# Patient Record
Sex: MALE | Race: WHITE | Employment: FULL TIME | URBAN - METROPOLITAN AREA
[De-identification: names, ages, dates, MRNs, and addresses within clinical notes are randomized per-mention and may not be internally consistent; named-entity substitution may affect disease eponyms.]

---

## 2017-01-13 PROBLEM — C61 PROSTATE CANCER (HCC): Status: ACTIVE | Noted: 2017-01-13

## 2017-01-26 PROBLEM — C61 CANCER OF PROSTATE WITH INTERMEDIATE RECURRENCE RISK (STAGE T2B-C OR GLEASON 7 OR PSA 10-20) (HCC): Status: ACTIVE | Noted: 2017-01-26

## 2017-05-27 PROCEDURE — 87086 URINE CULTURE/COLONY COUNT: CPT | Performed by: PHYSICIAN ASSISTANT

## 2017-06-07 PROBLEM — R39.12 WEAK URINARY STREAM: Status: ACTIVE | Noted: 2017-06-07

## 2017-06-07 PROBLEM — R31.0 GROSS HEMATURIA: Status: ACTIVE | Noted: 2017-06-07

## 2017-06-07 PROBLEM — Z92.3: Status: ACTIVE | Noted: 2017-06-07

## 2017-11-19 PROBLEM — R31.0 GROSS HEMATURIA: Status: RESOLVED | Noted: 2017-06-07 | Resolved: 2017-11-19

## 2017-11-19 PROBLEM — C61 PROSTATE CANCER (HCC): Status: RESOLVED | Noted: 2017-01-13 | Resolved: 2017-11-19

## 2017-11-29 PROBLEM — R39.15 URGENCY OF MICTURITION: Status: ACTIVE | Noted: 2017-11-29

## 2017-11-29 PROBLEM — Z85.46 HISTORY OF PROSTATE CANCER: Status: ACTIVE | Noted: 2017-11-29

## 2017-11-29 PROBLEM — R35.0 URINARY FREQUENCY: Status: ACTIVE | Noted: 2017-11-29

## 2017-11-29 PROBLEM — Z92.3 HISTORY OF RADIATION THERAPY: Status: ACTIVE | Noted: 2017-06-07

## 2018-01-23 PROBLEM — N39.41 URGENCY INCONTINENCE: Status: ACTIVE | Noted: 2018-01-23

## 2018-01-23 PROBLEM — R33.9 INCOMPLETE BLADDER EMPTYING: Status: ACTIVE | Noted: 2018-01-23

## 2018-05-21 PROBLEM — N40.1 BENIGN LOCALIZED PROSTATIC HYPERPLASIA WITH LOWER URINARY TRACT SYMPTOMS (LUTS): Status: ACTIVE | Noted: 2018-05-21

## 2018-05-21 PROBLEM — E66.9 OBESITY, UNSPECIFIED CLASSIFICATION, UNSPECIFIED OBESITY TYPE, UNSPECIFIED WHETHER SERIOUS COMORBIDITY PRESENT: Status: ACTIVE | Noted: 2018-05-21

## 2018-05-21 PROBLEM — I25.10 CAD IN NATIVE ARTERY: Status: ACTIVE | Noted: 2018-05-21

## 2018-05-21 PROBLEM — Z85.46 HISTORY OF PROSTATE CANCER: Status: RESOLVED | Noted: 2017-11-29 | Resolved: 2018-05-21

## 2018-05-23 PROBLEM — Z80.42 FAMILY HISTORY OF PROSTATE CANCER: Status: ACTIVE | Noted: 2018-05-23

## 2018-05-23 PROBLEM — N32.81 OAB (OVERACTIVE BLADDER): Status: ACTIVE | Noted: 2018-05-23

## 2018-07-11 PROBLEM — M54.50 ACUTE RIGHT-SIDED LOW BACK PAIN WITHOUT SCIATICA: Status: ACTIVE | Noted: 2018-07-11

## 2018-08-22 PROBLEM — D35.01 ADRENAL ADENOMA, RIGHT: Status: ACTIVE | Noted: 2018-08-22

## 2018-08-22 PROBLEM — M47.816 ARTHRITIS OF LUMBAR SPINE: Status: ACTIVE | Noted: 2018-08-22

## 2018-08-22 PROBLEM — M43.10 ANTEROLISTHESIS: Status: ACTIVE | Noted: 2018-08-22

## 2018-08-28 PROCEDURE — 87340 HEPATITIS B SURFACE AG IA: CPT | Performed by: INTERNAL MEDICINE

## 2018-08-28 PROCEDURE — 86704 HEP B CORE ANTIBODY TOTAL: CPT | Performed by: INTERNAL MEDICINE

## 2018-08-28 PROCEDURE — 86706 HEP B SURFACE ANTIBODY: CPT | Performed by: INTERNAL MEDICINE

## 2019-01-22 PROBLEM — M17.11 ARTHRITIS OF RIGHT KNEE: Status: ACTIVE | Noted: 2019-01-22

## 2019-06-08 PROCEDURE — 81003 URINALYSIS AUTO W/O SCOPE: CPT | Performed by: UROLOGY

## 2019-12-04 PROBLEM — R35.1 NOCTURIA: Status: ACTIVE | Noted: 2019-12-04

## 2021-01-26 PROBLEM — Z00.00 ROUTINE GENERAL MEDICAL EXAMINATION AT A HEALTH CARE FACILITY: Status: ACTIVE | Noted: 2021-01-26

## 2022-04-03 PROBLEM — N39.41 URGENCY INCONTINENCE: Status: RESOLVED | Noted: 2018-01-23 | Resolved: 2022-04-03

## 2022-04-03 PROBLEM — R33.9 INCOMPLETE BLADDER EMPTYING: Status: RESOLVED | Noted: 2018-01-23 | Resolved: 2022-04-03

## 2022-04-03 PROBLEM — M54.50 ACUTE RIGHT-SIDED LOW BACK PAIN WITHOUT SCIATICA: Status: RESOLVED | Noted: 2018-07-11 | Resolved: 2022-04-03

## 2022-04-03 PROBLEM — Z80.42 FAMILY HISTORY OF PROSTATE CANCER: Status: RESOLVED | Noted: 2018-05-23 | Resolved: 2022-04-03

## 2022-04-03 PROBLEM — N40.1 BENIGN LOCALIZED PROSTATIC HYPERPLASIA WITH LOWER URINARY TRACT SYMPTOMS (LUTS): Status: RESOLVED | Noted: 2018-05-21 | Resolved: 2022-04-03

## 2022-04-03 PROBLEM — E66.9 OBESITY, UNSPECIFIED CLASSIFICATION, UNSPECIFIED OBESITY TYPE, UNSPECIFIED WHETHER SERIOUS COMORBIDITY PRESENT: Status: RESOLVED | Noted: 2018-05-21 | Resolved: 2022-04-03

## 2022-04-03 PROBLEM — R73.02 IGT (IMPAIRED GLUCOSE TOLERANCE): Status: ACTIVE | Noted: 2022-04-03

## 2022-04-03 PROBLEM — N32.81 OAB (OVERACTIVE BLADDER): Status: RESOLVED | Noted: 2018-05-23 | Resolved: 2022-04-03

## 2022-04-03 PROBLEM — Z00.00 ROUTINE GENERAL MEDICAL EXAMINATION AT A HEALTH CARE FACILITY: Status: RESOLVED | Noted: 2021-01-26 | Resolved: 2022-04-03

## 2022-04-03 PROBLEM — R35.0 URINARY FREQUENCY: Status: RESOLVED | Noted: 2017-11-29 | Resolved: 2022-04-03

## 2025-01-24 ENCOUNTER — TELEPHONE (OUTPATIENT)
Dept: ORTHOPEDICS CLINIC | Facility: CLINIC | Age: 75
End: 2025-01-24

## 2025-01-24 NOTE — TELEPHONE ENCOUNTER
Patient is scheduled for spinal stenosis. No xrays or MRI in [facility]. Patient has an MRI disk from April 2023 only. Let him know new imaging would be ordered for his appointment.  Future Appointments   Date Time Provider Department Center   1/27/2025 10:30 AM Chase Dia PA [facility] ORTH [facility] 10 [facility]

## 2025-01-27 ENCOUNTER — HOSPITAL ENCOUNTER (OUTPATIENT)
Dept: GENERAL RADIOLOGY | Facility: HOSPITAL | Age: 75
Discharge: HOME | End: 2025-01-27
Attending: PHYSICIAN ASSISTANT
Payer: MEDICARE

## 2025-01-27 ENCOUNTER — OFFICE VISIT (OUTPATIENT)
Age: 75
End: 2025-01-27
Payer: MEDICARE

## 2025-01-27 DIAGNOSIS — M54.50 LOW BACK PAIN, UNSPECIFIED BACK PAIN LATERALITY, UNSPECIFIED CHRONICITY, UNSPECIFIED WHETHER SCIATICA PRESENT: Primary | ICD-10-CM

## 2025-01-27 DIAGNOSIS — S22.080A COMPRESSION FRACTURE OF T11 VERTEBRA, INITIAL ENCOUNTER (HCC): ICD-10-CM

## 2025-01-27 DIAGNOSIS — M54.50 LOW BACK PAIN, UNSPECIFIED BACK PAIN LATERALITY, UNSPECIFIED CHRONICITY, UNSPECIFIED WHETHER SCIATICA PRESENT: ICD-10-CM

## 2025-01-27 DIAGNOSIS — M48.062 SPINAL STENOSIS OF LUMBAR REGION WITH NEUROGENIC CLAUDICATION: ICD-10-CM

## 2025-01-27 DIAGNOSIS — M43.16 SPONDYLOLISTHESIS, LUMBAR REGION: ICD-10-CM

## 2025-01-27 PROCEDURE — 72100 X-RAY EXAM L-S SPINE 2/3 VWS: CPT | Performed by: PHYSICIAN ASSISTANT

## 2025-01-27 PROCEDURE — 99204 OFFICE O/P NEW MOD 45 MIN: CPT | Performed by: PHYSICIAN ASSISTANT

## 2025-01-27 NOTE — PROGRESS NOTES
Patient: Bandar Medrano  Medical Record Number: [id number]  Site: [facility]  Referring Physician:  No ref. provider found  PCP: None Pcp        HISTORY OF CHIEF COMPLAINT:    Bandar Medrano is a 74 year old male, who complains of a long h/o left side radiating LBP.  Denies saddle anesthesia or incontinence.  Patient has a long history of low back pain that shoots from his left buttock down the side of his leg to his ankle.  He has had a long history of numbness in bilateral feet.  He feels weak with his left leg when walking.  He does use a walker because his left leg feels like it gave out on him.  He has had a history of multiple falls.  He has failed to get relief with physical therapy and injections.  He was seeing Dr. Rosario for injections.  He was told by pain management to see a surgeon.  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  Unfortunately his wife passed away last year.    VAS Pain Score: 5 /10          Past Medical History:    Acute right-sided low back pain without sciatica    Adrenal adenoma, right    Ct  7/18    Anterolisthesis    L4-5 arthritis ct      Arthritis of lumbar spine    Arthritis of right knee    Benign localized prostatic hyperplasia with lower urinary tract symptoms (LUTS)    CAD in native artery    Detected by CTA Negative stress test RCA occluded ?Lad disease?    Cancer of prostate with intermediate recurrence risk (stage T2b-c or Selin 7 or PSA 10-20) (HCC)    Carpal tunnel syndrome, unspecified laterality    Chronic fatigue    Elevated blood pressure reading without diagnosis of hypertension    Insomnia, unspecified type    LFT elevation    Male hypogonadism    Obesity, unspecified classification, unspecified obesity type, unspecified whether serious comorbidity present    VLAD on CPAP    AHI 96 Sao2 Nate 73% CPAP 14cwp    Pernicious anemia    Prostate cancer (HCC)    seeds         RT and then seeds   2017    Sleep apnea    Status  post total left knee replacement    Tinea manuum      Past Surgical History:   Procedure Laterality Date    Biopsy of prostate,needle/punch  01/29/2017    4+3=7, 3+4=7    Colonoscopy  2005    diverticulosis    Colonoscopy  8/12/15    diverticulosis;ASC    Colonoscopy,diagnostic  8/11/2010    Performed by KHAI ROCHA at [facility]    Colonoscopy,diagnostic N/A 8/12/2015    Procedure: COLONOSCOPY, POSSIBLE BIOPSY, POSSIBLE POLYPECTOMY 48313;  Surgeon: Khai Rocha MD;  Location: [facility]    Other surgical history      rt shoulder  sx  1997 and 2000    Other surgical history  10/12/10    Right rotator cuff repair at [facility]; Dr. Duran Oliver    Other surgical history  5/5/17    Seeds    Other surgical history  01/23/2018    Cysto - Dr Elizabeth    Other surgical history      L knee TKR    [place] 2016    Prostate brachy w iodine see  05/05/2017    Iodine, 55 seeds    Special service or report  10/12/10    torn right rotator cuff    Tonsillectomy      had sx when pt was 7 years old    Total knee replacement  7/27/16    Left TKR      Family History   Problem Relation Age of Onset    Cancer Father         lung    Cancer Mother         ovarian    Cancer Brother         colon and skin       Social History     Socioeconomic History    Marital status:      Spouse name: Lima    Number of children: 4    Years of education: 13   Occupational History    Occupation:      Comment: [employer]   Tobacco Use    Smoking status: Never    Smokeless tobacco: Never   Vaping Use    Vaping status: Never Used   Substance and Sexual Activity    Alcohol use: No    Drug use: No    Sexual activity: Never      Current Medications:  Current Outpatient Medications   Medication Sig Dispense Refill    HYDROcodone-acetaminophen (NORCO)  MG Oral Tab One pill tid as needed 100 tablet 0    ezetimibe 10 MG Oral Tab Take 1 tablet (10 mg total) by mouth daily. 90 tablet 3     metroNIDAZOLE 1 % External Gel Apply 1 Application topically 2 (two) times daily. 30 g 5    aspirin 81 MG Oral Tab Take 1 tablet (81 mg total) by mouth daily.          Work History:  The patient currently is retired.    REVIEW OF SYSTEMS:   GENERAL HEALTH: No fevers, chills, recent weight loss or unremitting night pain,  has difficulty sleeping, fatigue  SKIN: No history of skin rashes,  easy bruising,  swollen ankles  EYES: No blurred vision, double vision or changes in vision.  HEENT: No swallowing or hearing difficulties.  RESPIRATORY:  shortness of breath.  CARDIOVASCULAR: No chest pain,  abnormal heart beat or palpitations.   GI: Denies nausea, vomiting, constipation, diarrhea; no rectal bleeding;  heartburn, no melana.  : No dysuria, urgency or frequency; no hematuria.  increase in night urination,  in-ablitity to completely empty bladder  NEURO: has balance problems, no seizure problems, has increased clumsiness, dropping things, no tremor,   headaches/ migraines  PSYCHE:  depression,  anxiety.  HEMATOLOGY: denies hx anemia; denies bruising or excessive bleeding.  ENDOCRINE: denies excessive thirst or urination; denies unexpected wt gain or wt loss.  MUSCULOSKELETAL: DENIES:, Joint pain, Swelling of joints, History of arthritis, Fibromyalgia, Osteoporosis, Hardware, Deformity, Limited Range of motion, Crepitation, Gout, Heel spurs  ALLERGY/IMM.: denies food or seasonal allergies.  No history of cancer, infectious disease exposure.    IMAGING STUDIES:  X-rays were reviewed with the patient today  X-rays  Images were reviewed and discussed with the patient.  They voiced understanding of what the images showed.  EXAM: X-RAY OF LUMBAR SPINE     CLINICAL INFORMATION: Back pain     FINDINGS:     AP, Lateral with flexion/extension views of lumbar spine completed.   5 lumbar vertebral bodies seen.   Spondylosis is seen with disc space loss and sclerotic endplate changes and facet joint hypertrophy noted throughout  the spine     No spondylolysis   L4-5 spondylolisthesis.  Possible T11 compression fracture  No destructive lesions seen.  Severe OA of the left hip joint        IMPRESSION:    Lumbar spondylosis as detailed above      Old MRI reviewed    PHYSICAL EXAMIMATION   PHYSICAL EXAMINATION: Bandar Medrano is a 74 year old   male who is observed sitting comfortably in the exam room alert and oriented times three. RESPIRATORY RATE: 18 He looks consistent his stated age.    There were no vitals taken for this visit.  The patient is well developed, well nourished      Inspection: No acute distress.   Patient displays non-antalgic gait, and is able to normal heel walk, normal toe walk.         ROM:  Forward Flexion  Pain free    Extension  Pain     Palpation:  See chart below:  Palpation of Lumbar Area Right   (POS or NEG) Left   (POS or NEG)   Lumbar paraspinals Neg Neg   SIJ Neg Neg   PSIS Neg Neg   Trochanteric Bursa Neg Neg     Strength:      DTR's:     Left Right    Left Right    EHL 5/5 5/5  Patella  2+/4 2+/4    DF 5/5 5/5  Achilles  2+/4 2+/4    PF 5/5 5/5    Quad 4/5 5/5    IP 4+/5 5/5    Sensation:  Sensory deficits noted on bilateral lower extremities to light touch: none    Tests:  Test Right   (POS or NEG) Left   (POS or NEG)   SLR Neg Neg   Clonus Neg Neg      Calves supple, NT   MUSCULOSKELETAL: Fluid, pain-free ROM to bilateral: ankles, knees  & mild pain with his left hip IR.                                                                                     MEDICAL DECISION MAKING:   Impression: Lumbar Spine:  Lumbar Spondylosis 721.3   Lumbar Spinal Stenosis 724.02   Spondylolisthesis Degenerative 738.4     Plan: We discussed the diagnosis and treatment options today, including rationale for surgical vs non-surgical options.  The patient has failed to get relief with both physical therapy and injections.  He describes neurogenic claudication.  He has a history of falls.  He does have weakness with his  left quad.  He has an L4-5 spondylolisthesis.  He has multiple levels of stenosis on his old lumbar spine MRI.  We reviewed both his MRI as well as his x-rays.  I am ordering a new MRI of his lumbar spine so that he can see Dr. Marin for surgical discussion.  I am ordering an MRI of his thoracic spine because it does appear that he may have a possible T11 compression fracture.  This could be due from one of his falls.  Patient has severe osteoarthritis of his left hip joint.  He will see Dr. Ellis who treats him for his right knee pain.  His concerns were addressed.  Restrictions and limitations were reviewed with the patient.  Concerns were addressed.  Questions were encouraged and answered.  Patient voiced understanding.  Images were reviewed and discussed with the patient.  They voiced understanding of what the images showed.        The patient indicates understanding of these issues and agrees to the plan.    Thank you very much.     Respectfully yours,    Chase Dia PA-C    This note was dictated using Dragon software. While it was briefly proofread prior to completion, some grammatical, spelling, and word choice errors due to dictation may still occur.

## 2025-02-19 ENCOUNTER — PATIENT MESSAGE (OUTPATIENT)
Age: 75
End: 2025-02-19

## 2025-02-19 NOTE — TELEPHONE ENCOUNTER
Spoke w/ patient.  Patient states he has pain that prevents him from completing MRI. He is unable to lay on his back.  Patient would like to know what his options are to assist w/ pain in order to complete MRI (medication, sedation, anesthesia).   Discussed w/ SONJA who recommends patient follow up with Dr. Marin prior to MRI.   Scheduled patient for telehealth appointment with Dr. Marin. Appt date/time provided to patient.   Answered patients questions.   Patient verbalized understanding and agreement.

## 2025-02-20 ENCOUNTER — PATIENT MESSAGE (OUTPATIENT)
Dept: SURGERY | Facility: CLINIC | Age: 75
End: 2025-02-20

## 2025-02-20 ENCOUNTER — VIRTUAL PHONE E/M (OUTPATIENT)
Dept: SURGERY | Facility: CLINIC | Age: 75
End: 2025-02-20
Payer: MEDICARE

## 2025-02-20 ENCOUNTER — TELEPHONE (OUTPATIENT)
Dept: SURGERY | Facility: CLINIC | Age: 75
End: 2025-02-20

## 2025-02-20 DIAGNOSIS — M54.16 LUMBAR RADICULOPATHY: Primary | ICD-10-CM

## 2025-02-20 DIAGNOSIS — M48.062 SPINAL STENOSIS OF LUMBAR REGION WITH NEUROGENIC CLAUDICATION: ICD-10-CM

## 2025-02-20 DIAGNOSIS — M43.17 SPONDYLOLISTHESIS OF LUMBOSACRAL REGION: ICD-10-CM

## 2025-02-20 DIAGNOSIS — M54.16 LUMBAR RADICULOPATHY: ICD-10-CM

## 2025-02-20 DIAGNOSIS — M43.8X9 SAGITTAL PLANE IMBALANCE: Primary | ICD-10-CM

## 2025-02-20 NOTE — PROGRESS NOTES
[facility] - SPINE SURGERY  [phone]     SPINE PROGRESS NOTE         The following individual(s) verbally consented to be recorded using ambient AI listening technology and understand that they can each withdraw their consent to this listening technology at any point by asking the clinician to turn off or pause the recording:    Patient name: Bandar Medrano  Additional names:          Name: Badnar Medrano   MRN: [id number]  Date: 2/20/2025     CC: No chief complaint on file.       HPI:   History of Present Illness  Bandar Medrano is a 74 year old male with severe spinal stenosis who presents with worsening back pain and mobility issues. He was referred by Dr. Carmona for evaluation of severe spinal stenosis.    He experiences excruciating back pain, particularly when attempting to lie down for an MRI, which he was unable to complete due to the severity of the pain. He requires the use of a walker for ambulation due to significant pain.    He has a history of severe spinal stenosis, previously identified in an MRI from April 2023, showing critical stenosis at L4-L5 with spondylolisthesis. He has experienced multiple falls, including one incident where he fell out of bed due to pain-induced restlessness at night.    He experiences pain primarily in his legs, which affects his balance and mobility, stating that without the walker, he has no balance. He also describes difficulty lifting his legs, particularly when climbing stairs, likening them to 'lead'.    He has been taking Norco and Tylenol Arthritis Strength for pain management, but these medications have not provided sufficient relief. Previous attempts at pain management included injections at various spinal levels, which provided limited relief.    He has a history of hip arthritis, particularly on the left side, which he attributes to a family history of arthritis, noting that his father also suffered from severe arthritis. He  experiences intermittent groin pain on the left side, which he associates with his hip condition. No current groin pain is reported, but there is significant weakness in his legs, contributing to his falls and difficulty with mobility.      PE: Patient reports that he is actively tripping falling and is very weak in bilateral lower extremities.  Cannot heel or toe walk          Radiographic Examination/Diagnostics:  I personally viewed, independently interpreted and radiology report was reviewed.  Results  RADIOLOGY  Lumbar spine MRI: Critical stenosis at L4-L5 with spondylolisthesis (04/13/2023)  Lumbar spine x-ray: Grade 2 spondylolisthesis at L4-L5 (01/27/2025)  Hip x-ray: Severe osteoarthritis, bone on bone on the left side        IMPRESSION: Bandar Medrano is a 74 year old male   Assessment & Plan  Lumbar Spinal Stenosis with Spondylolisthesis  Chronic lumbar spinal stenosis with grade 2 spondylolisthesis at L4-L5 causes severe back pain, weakness, and falls. An MRI from April 2023 showed critical stenosis. Pain has increased, making MRI completion difficult. Surgical intervention is likely necessary. Discussed posterior lumbar fusion surgery, which involves two small incisions, nerve decompression, and insertion of hardware and a cage device. Risks include bleeding, infection, and nerve injuries (1-2% risk), with paralysis being extremely unlikely. Surgery lasts about 1.5 hours with an overnight hospital stay. Expected outcomes are pain relief and improved mobility, though not perfect. Order MRI of the lumbar spine with IV sedation. Discuss posterior lumbar fusion surgery and refer to [email or web address] for detailed information. Schedule a follow-up visit after MRI results.    Hip Osteoarthritis  Severe hip osteoarthritis, especially on the left side, causes bone-on-bone contact, contributing to pain and mobility issues. Monitor the condition and consider future orthopedic consultation for hip  management.    General Health Maintenance  Health has been neglected due to caregiving responsibilities. Now addressing multiple health issues. Encourage regular follow-up appointments and provide support and resources for managing chronic conditions.    Follow-up  Schedule a follow-up visit after MRI results. Coordinate with Chase for more accessible follow-up if needed.        ICD-10-CM    1. Lumbar radiculopathy  M54.16 MRI THORACIC+LUMBAR SPINE  (CPT=72146/84687)      2. Spinal stenosis of lumbar region with neurogenic claudication  M48.062 MRI THORACIC+LUMBAR SPINE  (CPT=72146/97268)      3. Spondylolisthesis of lumbosacral region  M43.17 MRI THORACIC+LUMBAR SPINE  (CPT=72146/24232)           PLAN: Bandar and MICAELA went over imaging, prior treatments and arranged for a plan that incorporated personal goals, social circumstances and any current medical challenges.     We had a thorough discussion going over the risks and benefits of a lumbar fusion.  Risks, including but not limited to, wound complications and infection (particularly in diabetics and the obese), blood loss,DVT/PE, back pain, dural injury, nerve injury, persistent symptoms, recurrence, pseudoarthrosis, adjacent segment degeneration/disease, possible need for future surgery, complications associated with anesthesia including death, were discussed with the patient. We discussed perioperative wound care, activity restrictions/limitations, expectations and recovery. The patient verbalizes their understanding. The patient had failed reasonable conservative measures which are outlined in the patient's clinic medical record.  Physical therapy, medical management, injections, alternative treatment are among those offered unless motor deficits are progressive.  Indications for surgery are based on scientific literature and evidence based guidelines.  A thorough meeting with the patient and at least one key caregiver was had.  The risks and benefits were  discussed in significant detail.  The risks include, but are not limited to, bleeding, infection, spinal leaks, nerve injuries, hardware failure/migration, pseudarthrosis, adjacent segment disease, and need for further surgery.  I have gone over the operation using models, diagrams, and the patient's own imaging studies.  Additional written and digital sources were provided.  No guarantees were made.      Greater than 30 minutes of total time was required in the care of the patient today.  Note to patient: The 21st Century Cures Act makes medical notes like these available to patients in the interest of transparency. However, be advised this is a medical document. It is intended primarily as peer to peer communication, is written in medical language, and may contain abbreviations or verbiage that are unfamiliar. This document is intended to carry relevant information, facts as evident, and the clinical opinion of the practitioner at the time of writing.     TIARA Marin MD  Orthopedic Spine Surgeon  [facility]   [facility].OR[facility], [employer]  t: [phone]  f: [phone]        This note was dictated using Dragon software.  While it was briefly proofread prior to completion, some grammatical, spelling, and word choice errors due to dictation may still occur.

## 2025-02-20 NOTE — TELEPHONE ENCOUNTER
Bandar SANCHEZ [facility] Clinical Pool (supporting P Latonia Marin MD)15 minutes ago (10:52 AM)        Dr. Marin,  I am scheduled to get a gel shot in my right knee on the date of the MRI procedure at the [place] location.  Would it be ok to do that or should I reschedule MRI

## 2025-02-20 NOTE — TELEPHONE ENCOUNTER
Bandar SANCHEZ [facility] Clinical Pool (supporting P Latonia Marin MD)9 minutes ago (10:33 AM)        Dr. Marin,  sorry to bother you, can you please tell the website you told me to look up per our conservation today.  I did not have anything to write it down and cannot remember.     Thank You,  Bandar EUCEDA

## 2025-02-20 NOTE — TELEPHONE ENCOUNTER
SURGERY SCHEDULING SHEET    Bandar Medrano  4/19/1950  [id number]    Procedure: L4-5 decompression interbody reconstruction and fusion with posterior osteotomy    Diagnosis:      ICD-10-CM    1. Sagittal plane imbalance  M43.8X9       2. Lumbar radiculopathy  M54.16       3. Spinal stenosis of lumbar region with neurogenic claudication  M48.062       4. Spondylolisthesis of lumbosacral region  M43.17           Disposition: Inpatient    Anesthesia: General     TLIF -- Time(hr): 1.5 -- Table: Marshall-Proaxis -- Hardware: Reline PPS -- Interbody: Cohere Oblique Cage/LO2 -- Biologics: Attrax/BMP -- NM: Yes -- CPT: 93697 - TLIF fist level  30714 - 1-2 segmental instrumentation   54861 - Central decompression w/ TLIF  74810 - Cage  20931 - Allograft   22214 - Posterior lumbar osteotomy        Laterality: BILATERAL     Assist: Chase Dia PA-C    C-Arm: Yes    Pre-op Testing: Per anesthesia guidelines.     Clearance: H&P/medical clearance. CBC w/ differential, CMP, UA w/ reflex to culture, PTT/PT/INR, MRSA/MSSA nasal swab, EKG, Chest xray (50 years or older)  Type and screen    Imaging: MRI Lumbar without contrast     Brace: Lumbar Chair Back (LSO)    Other: Bone Stimulator     Home health: Yes    Prehab: Yes    Post op: 2 week post op w/ SONJA Marin MD  [facility]  Phone: [phone]  Fax: [phone]

## 2025-03-06 RX ORDER — UBIDECARENONE 75 MG
250 CAPSULE ORAL DAILY
COMMUNITY

## 2025-03-06 RX ORDER — ACETAMINOPHEN 160 MG
2000 TABLET,DISINTEGRATING ORAL DAILY
COMMUNITY

## 2025-03-06 RX ORDER — SENNOSIDES 8.6 MG
650 CAPSULE ORAL EVERY 8 HOURS PRN
COMMUNITY

## 2025-03-11 ENCOUNTER — ANESTHESIA EVENT (OUTPATIENT)
Dept: MRI IMAGING | Facility: HOSPITAL | Age: 75
End: 2025-03-11
Payer: MEDICARE

## 2025-03-11 ENCOUNTER — HOSPITAL ENCOUNTER (OUTPATIENT)
Dept: MRI IMAGING | Facility: HOSPITAL | Age: 75
Discharge: HOME | End: 2025-03-11
Attending: ORTHOPAEDIC SURGERY
Payer: MEDICARE

## 2025-03-11 ENCOUNTER — ANESTHESIA (OUTPATIENT)
Dept: MRI IMAGING | Facility: HOSPITAL | Age: 75
End: 2025-03-11
Payer: MEDICARE

## 2025-03-11 VITALS
BODY MASS INDEX: 34.9 KG/M2 | RESPIRATION RATE: 18 BRPM | TEMPERATURE: 99 F | HEIGHT: 67.5 IN | SYSTOLIC BLOOD PRESSURE: 124 MMHG | OXYGEN SATURATION: 97 % | WEIGHT: 225 LBS | DIASTOLIC BLOOD PRESSURE: 59 MMHG | HEART RATE: 78 BPM

## 2025-03-11 DIAGNOSIS — M48.062 SPINAL STENOSIS OF LUMBAR REGION WITH NEUROGENIC CLAUDICATION: ICD-10-CM

## 2025-03-11 DIAGNOSIS — M43.17 SPONDYLOLISTHESIS OF LUMBOSACRAL REGION: ICD-10-CM

## 2025-03-11 DIAGNOSIS — M54.16 LUMBAR RADICULOPATHY: ICD-10-CM

## 2025-03-11 PROCEDURE — 72148 MRI LUMBAR SPINE W/O DYE: CPT | Performed by: ORTHOPAEDIC SURGERY

## 2025-03-11 PROCEDURE — 72146 MRI CHEST SPINE W/O DYE: CPT | Performed by: ORTHOPAEDIC SURGERY

## 2025-03-11 RX ORDER — METOCLOPRAMIDE HYDROCHLORIDE 5 MG/ML
10 INJECTION INTRAMUSCULAR; INTRAVENOUS ONCE
Status: COMPLETED | OUTPATIENT
Start: 2025-03-11 | End: 2025-03-11

## 2025-03-11 RX ORDER — FAMOTIDINE 10 MG/ML
20 INJECTION, SOLUTION INTRAVENOUS ONCE
Status: COMPLETED | OUTPATIENT
Start: 2025-03-11 | End: 2025-03-11

## 2025-03-11 RX ORDER — MORPHINE SULFATE 10 MG/ML
6 INJECTION, SOLUTION INTRAMUSCULAR; INTRAVENOUS EVERY 10 MIN PRN
Status: DISCONTINUED | OUTPATIENT
Start: 2025-03-11 | End: 2025-03-13

## 2025-03-11 RX ORDER — SODIUM CHLORIDE, SODIUM LACTATE, POTASSIUM CHLORIDE, CALCIUM CHLORIDE 600; 310; 30; 20 MG/100ML; MG/100ML; MG/100ML; MG/100ML
INJECTION, SOLUTION INTRAVENOUS CONTINUOUS
Status: DISCONTINUED | OUTPATIENT
Start: 2025-03-11 | End: 2025-03-13

## 2025-03-11 RX ORDER — HYDROMORPHONE HYDROCHLORIDE 1 MG/ML
0.2 INJECTION, SOLUTION INTRAMUSCULAR; INTRAVENOUS; SUBCUTANEOUS EVERY 5 MIN PRN
Status: DISCONTINUED | OUTPATIENT
Start: 2025-03-11 | End: 2025-03-13

## 2025-03-11 RX ORDER — NICOTINE POLACRILEX 4 MG
30 LOZENGE BUCCAL
Status: DISCONTINUED | OUTPATIENT
Start: 2025-03-11 | End: 2025-03-13

## 2025-03-11 RX ORDER — FAMOTIDINE 20 MG/1
20 TABLET, FILM COATED ORAL ONCE
Status: COMPLETED | OUTPATIENT
Start: 2025-03-11 | End: 2025-03-11

## 2025-03-11 RX ORDER — MORPHINE SULFATE 4 MG/ML
2 INJECTION, SOLUTION INTRAMUSCULAR; INTRAVENOUS EVERY 10 MIN PRN
Status: DISCONTINUED | OUTPATIENT
Start: 2025-03-11 | End: 2025-03-13

## 2025-03-11 RX ORDER — HYDROMORPHONE HYDROCHLORIDE 1 MG/ML
0.4 INJECTION, SOLUTION INTRAMUSCULAR; INTRAVENOUS; SUBCUTANEOUS EVERY 5 MIN PRN
Status: DISCONTINUED | OUTPATIENT
Start: 2025-03-11 | End: 2025-03-13

## 2025-03-11 RX ORDER — METOCLOPRAMIDE 10 MG/1
10 TABLET ORAL ONCE
Status: COMPLETED | OUTPATIENT
Start: 2025-03-11 | End: 2025-03-11

## 2025-03-11 RX ORDER — MORPHINE SULFATE 4 MG/ML
4 INJECTION, SOLUTION INTRAMUSCULAR; INTRAVENOUS EVERY 10 MIN PRN
Status: DISCONTINUED | OUTPATIENT
Start: 2025-03-11 | End: 2025-03-13

## 2025-03-11 RX ORDER — HYDROMORPHONE HYDROCHLORIDE 1 MG/ML
0.6 INJECTION, SOLUTION INTRAMUSCULAR; INTRAVENOUS; SUBCUTANEOUS EVERY 5 MIN PRN
Status: DISCONTINUED | OUTPATIENT
Start: 2025-03-11 | End: 2025-03-13

## 2025-03-11 RX ORDER — NALOXONE HYDROCHLORIDE 0.4 MG/ML
0.08 INJECTION, SOLUTION INTRAMUSCULAR; INTRAVENOUS; SUBCUTANEOUS AS NEEDED
Status: ACTIVE | OUTPATIENT
Start: 2025-03-11 | End: 2025-03-11

## 2025-03-11 RX ORDER — DEXTROSE MONOHYDRATE 25 G/50ML
50 INJECTION, SOLUTION INTRAVENOUS
Status: DISCONTINUED | OUTPATIENT
Start: 2025-03-11 | End: 2025-03-13

## 2025-03-11 RX ORDER — NICOTINE POLACRILEX 4 MG
15 LOZENGE BUCCAL
Status: DISCONTINUED | OUTPATIENT
Start: 2025-03-11 | End: 2025-03-13

## 2025-03-11 RX ADMIN — FAMOTIDINE 20 MG: 20 TABLET, FILM COATED ORAL at 12:04:00

## 2025-03-11 RX ADMIN — METOCLOPRAMIDE 10 MG: 10 TABLET ORAL at 12:04:00

## 2025-03-11 RX ADMIN — SODIUM CHLORIDE, SODIUM LACTATE, POTASSIUM CHLORIDE, CALCIUM CHLORIDE: 600; 310; 30; 20 INJECTION, SOLUTION INTRAVENOUS at 12:05:00

## 2025-03-11 NOTE — DISCHARGE INSTRUCTIONS
HOME INSTRUCTIONS  [facility] CARE INSTRUCTIONS: POST-OP ANESTHESIA  The medication that you received for sedation or general anesthesia can last up to 24 hours. Your judgment and reflexes may be altered, even if you feel like your normal self.      We Recommend:   Do not drive any motor vehicle or bicycle   Avoid mowing the lawn, playing sports, or working with power tools/applicances (power saws, electric knives or mixers)   That you have someone stay with you on your first night home   Do not drink alcohol or take sleeping pills or tranquilizers   Do not sign legal documents within 24 hours of your procedure   If you had a nerve block for your surgery, take extra care not to put any pressure on your arm or hand for 24 hours    It is normal:  For you to have a sore throat if you had a breathing tube during surgery (while you were asleep!). The sore throat should get better within 48 hours. You can gargle with warm salt water (1/2 tsp in 4 oz warm water) or use a throat lozenge for comfort  To feel muscle aches or soreness especially in the abdomen, chest or neck. The achy feeling should go away in the next 24 hours  To feel weak, sleepy or \"wiped out\". Your should start feeling better in the next 24 hours.   To experience mild discomforts such as sore lip or tongue, headache, cramps, gas pains or a bloated feeling in your abdomen.   To experience mild back pain or soreness for a day or two if you had spinal or epidural anesthesia.   If you had laparoscopic surgery, to feel shoulder pain or discomfort on the day of surgery.   For some patients to have nausea after surgery/anesthesia    If you feel nausea or experience vomiting:   Try to move around less.   Eat less than usual or drink only liquids until the next morning   Nausea should resolve in about 24 hours    If you have a problem when you are at home:    Call your surgeons office     Discharge Instructions: After Your Surgery  You’ve just had surgery.  During surgery, you were given medicine called anesthesia to keep you relaxed and free of pain. After surgery, you may have some pain or nausea. This is common. Here are some tips for feeling better and getting well after surgery.   Going home  Your healthcare provider will show you how to take care of yourself when you go home. They'll also answer your questions. Have an adult family member or friend drive you home. For the first 24 hours after your surgery:   Don't drive or use heavy equipment.  Don't make important decisions or sign legal papers.  Take medicines as directed.  Don't drink alcohol.  Have someone stay with you, if needed. They can watch for problems and help keep you safe.  Be sure to go to all follow-up visits with your healthcare provider. And rest after your surgery for as long as your provider tells you to.   Coping with pain  If you have pain after surgery, pain medicine will help you feel better. Take it as directed, before pain becomes severe. Also, ask your healthcare provider or pharmacist about other ways to control pain. This might be with heat, ice, or relaxation. And follow any other instructions your surgeon or nurse gives you.      Stay on schedule with your medicine.     Tips for taking pain medicine  To get the best relief possible, remember these points:   Pain medicines can upset your stomach. Taking them with a little food may help.  Most pain relievers taken by mouth need at least 20 to 30 minutes to start to work.  Don't wait till your pain becomes severe before you take your medicine. Try to time your medicine so that you can take it before starting an activity. This might be before you get dressed, go for a walk, or sit down for dinner.  Constipation is a common side effect of some pain medicines. Call your healthcare provider before taking any medicines such as laxatives or stool softeners to help ease constipation. Also ask if you should skip any foods. Drinking lots of  fluids and eating foods such as fruits and vegetables that are high in fiber can also help. Remember, don't take laxatives unless your surgeon has prescribed them.  Drinking alcohol and taking pain medicine can cause dizziness and slow your breathing. It can even be deadly. Don't drink alcohol while taking pain medicine.  Pain medicine can make you react more slowly to things. Don't drive or run machinery while taking pain medicine.  Your healthcare provider may tell you to take acetaminophen to help ease your pain. Ask them how much you're supposed to take each day. Acetaminophen or other pain relievers may interact with your prescription medicines or other over-the-counter (OTC) medicines. Some prescription medicines have acetaminophen and other ingredients in them. Using both prescription and OTC acetaminophen for pain can cause you to accidentally overdose. Read the labels on your OTC medicines with care. This will help you to clearly know the list of ingredients, how much to take, and any warnings. It may also help you not take too much acetaminophen. If you have questions or don't understand the information, ask your pharmacist or healthcare provider to explain it to you before you take the OTC medicine.   Managing nausea  Some people have an upset stomach (nausea) after surgery. This is often because of anesthesia, pain, or pain medicine, less movement of food in the stomach, or the stress of surgery. These tips will help you handle nausea and eat healthy foods as you get better. If you were on a special food plan before surgery, ask your healthcare provider if you should follow it while you get better. Check with your provider on how your eating should progress. It may depend on the surgery you had. These general tips may help:   Don't push yourself to eat. Your body will tell you when to eat and how much.  Start off with clear liquids and soup. They're easier to digest.  Next try semi-solid foods as you  feel ready. These include mashed potatoes, applesauce, and gelatin.  Slowly move to solid foods. Don’t eat fatty, rich, or spicy foods at first.  Don't force yourself to have 3 large meals a day. Instead eat smaller amounts more often.  Take pain medicines with a small amount of solid food, such as crackers or toast. This helps prevent nausea.  When to call your healthcare provider  Call your healthcare provider right away if you have any of these:   You still have too much pain, or the pain gets worse, after taking the medicine. The medicine may not be strong enough. Or there may be a complication from the surgery.  You feel too sleepy, dizzy, or groggy. The medicine may be too strong.  Side effects such as nausea or vomiting. Your healthcare provider may advise taking other medicines to .  Skin changes such as rash, itching, or hives. This may mean you have an allergic reaction. Your provider may advise taking other medicines.  The incision looks different (for instance, part of it opens up).  Bleeding or fluid leaking from the incision site, and weren't told to expect that.  Fever of 100.4°F (38°C) or higher, or as directed by your provider.  Call 911  Call 911 right away if you have:   Trouble breathing  Facial swelling    If you have obstructive sleep apnea   You were given anesthesia medicine during surgery to keep you comfortable and free of pain. After surgery, you may have more apnea spells because of this medicine and other medicines you were given. The spells may last longer than normal.    At home:  Keep using the continuous positive airway pressure (CPAP) device when you sleep. Unless your healthcare provider tells you not to, use it when you sleep, day or night. CPAP is a common device used to treat obstructive sleep apnea.  Talk with your provider before taking any pain medicine, muscle relaxants, or sedatives. Your provider will tell you about the possible dangers of taking these medicines.  Contact  your provider if your sleeping changes a lot even when taking medicines as directed.  Kristine last reviewed this educational content on 10/1/2021  © 7722-4177 [employer]. All rights reserved. This information is not intended as a substitute for professional medical care. Always follow your healthcare professional's instructions.

## 2025-03-11 NOTE — ANESTHESIA POSTPROCEDURE EVALUATION
Patient: Bandar Medrano    Procedure Summary       Date: 03/11/25 Room / Location: [facility] MRI; [facility] Post Anesthesia Care Unit    Anesthesia Start: 1334 Anesthesia Stop: 1507    Procedures:       MRI SPINE THORACIC (CPT=72146)      MRI SPINE LUMBAR (CPT=72148) Diagnosis:       Lumbar radiculopathy      Spinal stenosis of lumbar region with neurogenic claudication      Spondylolisthesis of lumbosacral region    Scheduled Providers:  Anesthesiologist: Christina Son MD    Anesthesia Type: general ASA Status: 2            Anesthesia Type: general    Vitals Value Taken Time   /81 03/11/25 1507   Temp 98.7 °F (37.1 °C) 03/11/25 1507   Pulse 80 03/11/25 1507   Resp 15 03/11/25 1507   SpO2 94 % 03/11/25 1507   Vitals shown include unfiled device data.    [facility] AN Post Evaluation:   Patient Evaluated in PACU  Patient Participation: complete - patient participated  Level of Consciousness: awake  Pain Score: 0  Pain Management: adequate  Airway Patency:patent  Dental exam unchanged from preop  Yes    Cardiovascular Status: acceptable  Respiratory Status: acceptable  Postoperative Hydration acceptable      Christina Son MD  3/11/2025 3:07 PM

## 2025-03-11 NOTE — ANESTHESIA PROCEDURE NOTES
Airway  Date/Time: 3/11/2025 1:45 PM  Urgency: Elective      General Information and Staff    Patient location during procedure: OR  Anesthesiologist: Christina Son MD  Performed: anesthesiologist   Performed by: Christina Son MD  Authorized by: Christina Son MD      Indications and Patient Condition  Indications for airway management: anesthesia  Sedation level: deep  Preoxygenated: yes  Patient position: sniffing  Mask difficulty assessment: 1 - vent by mask    Final Airway Details  Final airway type: supraglottic airway      Successful airway: classic  Size 5       Number of attempts at approach: 1

## 2025-03-11 NOTE — ANESTHESIA PREPROCEDURE EVALUATION
Anesthesia PreOp Note    HPI:     Bandar Medrano is a 74 year old male who presents for preoperative consultation requested by: * No surgeons listed *    Date of Surgery: 3/11/2025    * No procedures listed *  Indication: * No pre-op diagnosis entered *    Relevant Problems   No relevant active problems       NPO:  Last Liquid Consumption Date: 03/10/25  Last Liquid Consumption Time: 1900  Last Solid Consumption Date: 03/10/25  Last Solid Consumption Time: 1900  Last Liquid Consumption Date: 03/10/25          History Review:  Patient Active Problem List    Diagnosis Date Noted    IGT (impaired glucose tolerance) 04/03/2022    Nocturia 12/04/2019    Arthritis of right knee 01/22/2019    Adrenal adenoma, right 08/22/2018    Anterolisthesis 08/22/2018    Arthritis of lumbar spine 08/22/2018    CAD in native artery 05/21/2018    History of prostate cancer 11/29/2017    History of radiation therapy 06/07/2017    Cancer of prostate with intermediate recurrence risk (stage T2b-c or Selin 7 or PSA 10-20) ([facility]) 01/26/2017    Status post total left knee replacement 09/14/2016    Statin intolerance 12/15/2015    Thrombocytopenia 05/31/2012    Hyperglycemia 08/18/2011    Vitamin D deficiency 08/18/2011    VLAD on CPAP 06/07/2010    Mixed hyperlipidemia 06/06/2010    Family history of colon cancer 06/06/2010    Carpal tunnel syndrome, unspecified laterality        Past Medical History:    Acute right-sided low back pain without sciatica    Adrenal adenoma, right    Ct [facility] 7/18    Anterolisthesis    L4-5 arthritis ct [facility]     Arthritis of lumbar spine    Arthritis of right knee    Benign localized prostatic hyperplasia with lower urinary tract symptoms (LUTS)    CAD in native artery    Detected by CTA Negative stress test RCA occluded ?Lad disease?    Cancer of prostate with intermediate recurrence risk (stage T2b-c or Selin 7 or PSA 10-20) ([facility])    Carpal tunnel syndrome, unspecified laterality    Chronic fatigue    Deep vein  thrombosis (HCC)    left leg    Elevated blood pressure reading without diagnosis of hypertension    High cholesterol    Insomnia, unspecified type    LFT elevation    Male hypogonadism    Obesity, unspecified classification, unspecified obesity type, unspecified whether serious comorbidity present    VLAD on CPAP    AHI 96 Sao2 Nate 73% CPAP 14cwp    Pernicious anemia    Prostate cancer (HCC)    seeds         RT and then seeds   2017    Pulmonary embolism (HCC)    Sleep apnea    CPAP    Status post total left knee replacement    Tinea manuum    Visual impairment    glasses       Past Surgical History:   Procedure Laterality Date    Biopsy of prostate,needle/punch  01/29/2017    4+3=7, 3+4=7    Carpal tunnel release Bilateral     Colonoscopy  2005    diverticulosis    Colonoscopy  08/12/2015    diverticulosis;ASC    Colonoscopy,diagnostic  08/11/2010    Performed by KHAI ROCHA at [facility]    Colonoscopy,diagnostic N/A 08/12/2015    Procedure: COLONOSCOPY, POSSIBLE BIOPSY, POSSIBLE POLYPECTOMY 89933;  Surgeon: Khai Rocha MD;  Location: [facility]    Other surgical history      rt shoulder  sx  1997 and 2000    Other surgical history  10/12/2010    Right rotator cuff repair at [facility]; Dr. Duran Oliver    Other surgical history  05/05/2017    Seeds    Other surgical history  01/23/2018    Cysto - Dr Elizabeth    Other surgical history      L knee TKR    [facility] 2016    Prostate brachy w iodine see  05/05/2017    Iodine, 55 seeds    Special service or report  10/12/2010    torn right rotator cuff    Tonsillectomy      had sx when pt was 7 years old    Total knee replacement  07/27/2016    Left TKR       Prescriptions Prior to Admission[1]  Current Medications and Prescriptions Ordered in Epic[2]    Allergies[3]    Family History   Problem Relation Age of Onset    Cancer Father         lung    Cancer Mother         ovarian    Cancer Brother         colon and skin      Social  History     Socioeconomic History    Marital status:      Spouse name: Lima    Number of children: 4    Years of education: 13   Occupational History    Occupation:      Comment: [employer]   Tobacco Use    Smoking status: Never    Smokeless tobacco: Never   Vaping Use    Vaping status: Never Used   Substance and Sexual Activity    Alcohol use: No    Drug use: No    Sexual activity: Never       Available pre-op labs reviewed.             Vital Signs:  Body mass index is 34.72 kg/m².   height is 1.715 m (5' 7.5\") and weight is 102.1 kg (225 lb). His oral temperature is 98.4 °F (36.9 °C). His blood pressure is 133/70 and his pulse is 76. His respiration is 16 and oxygen saturation is 96%.   Vitals:    03/06/25 1054 03/11/25 1143   BP:  133/70   Pulse:  76   Resp:  16   Temp:  98.4 °F (36.9 °C)   TempSrc:  Oral   SpO2:  96%   Weight: 104.3 kg (230 lb) 102.1 kg (225 lb)   Height: 1.715 m (5' 7.5\") 1.715 m (5' 7.5\")        Anesthesia Evaluation     Patient summary reviewed and Nursing notes reviewed    Airway   Mallampati: II  TM distance: >3 FB  Neck ROM: full  Dental      Pulmonary - normal exam   (+) sleep apnea  Cardiovascular - normal exam  Exercise tolerance: good  (+) CAD    ECG reviewed    Neuro/Psych    (+)  neuromuscular disease,        GI/Hepatic/Renal      Endo/Other    Abdominal                  Anesthesia Plan:   ASA:  2  Plan:   General  Post-op Pain Management: IV analgesics  Informed Consent Plan and Risks Discussed With:  Patient  Discussed plan with:  Surgeon      I have informed Bandar Medrano and/or legal guardian or family member of the nature of the anesthetic plan, benefits, risks including possible dental damage if relevant, major complications, and any alternative forms of anesthetic management.   All of the patient's questions were answered to the best of my ability. The patient desires the anesthetic management as planned.  Christina Son MD  3/11/2025 12:11  PM  Present on Admission:  **None**           [1] (Not in a hospital admission)  [2]   Current Outpatient Medications Ordered in Epic   Medication Sig Dispense Refill    apixaban 2.5 MG Oral Tab Take 1 tablet (2.5 mg total) by mouth 2 (two) times daily.      cholecalciferol 50 MCG (2000 UT) Oral Cap Take 1 capsule (2,000 Units total) by mouth daily.      cyanocobalamin 100 MCG Oral Tab Take 2.5 tablets (250 mcg total) by mouth daily.      Acetaminophen ER (TYLENOL 8 HOUR ARTHRITIS PAIN) 650 MG Oral Tab CR Take 1 tablet (650 mg total) by mouth every 8 (eight) hours as needed for Pain.      HYDROcodone-acetaminophen (NORCO)  MG Oral Tab One pill tid as needed 100 tablet 0    ezetimibe 10 MG Oral Tab Take 1 tablet (10 mg total) by mouth daily. 90 tablet 3    aspirin 81 MG Oral Tab Take 1 tablet (81 mg total) by mouth daily.       Current Facility-Administered Medications Ordered in Epic   Medication Dose Route Frequency Provider Last Rate Last Admin    lactated ringers infusion   Intravenous Continuous LAURA Marin MD 20 mL/hr at 03/11/25 1205 New Bag at 03/11/25 1205   [3]   Allergies  Allergen Reactions    Oxybutynin HIVES    Statins MYALGIA     Statin intolerant

## 2025-03-11 NOTE — PLAN OF CARE
Called Pt daughter, will be by to  pt, will call me when arrives at the main entrance, Pt drinking coffee and eating snack, gave urinal as requested, Pt stated back hurts too much to walk around

## 2025-03-17 ENCOUNTER — PATIENT MESSAGE (OUTPATIENT)
Age: 75
End: 2025-03-17

## 2025-03-17 NOTE — TELEPHONE ENCOUNTER
Called and spoke w/ patient.   Scheduled patient for f/u video visit w/ Dr. Marin for MRI review. Appt date/time provided to patient.  Answered patients questions.     Future Appointments   Date Time Provider Department Center   3/27/2025  9:00 AM [facility]

## 2025-03-20 ENCOUNTER — PATIENT MESSAGE (OUTPATIENT)
Dept: SURGERY | Facility: CLINIC | Age: 75
End: 2025-03-20

## 2025-07-11 ENCOUNTER — PATIENT MESSAGE (OUTPATIENT)
Age: 75
End: 2025-07-11